# Patient Record
Sex: MALE | Race: WHITE | NOT HISPANIC OR LATINO | Employment: PART TIME | URBAN - METROPOLITAN AREA
[De-identification: names, ages, dates, MRNs, and addresses within clinical notes are randomized per-mention and may not be internally consistent; named-entity substitution may affect disease eponyms.]

---

## 2020-07-29 ENCOUNTER — APPOINTMENT (OUTPATIENT)
Dept: RADIOLOGY | Facility: MEDICAL CENTER | Age: 25
End: 2020-07-29
Attending: EMERGENCY MEDICINE
Payer: COMMERCIAL

## 2020-07-29 ENCOUNTER — HOSPITAL ENCOUNTER (EMERGENCY)
Facility: MEDICAL CENTER | Age: 25
End: 2020-07-29
Attending: EMERGENCY MEDICINE
Payer: COMMERCIAL

## 2020-07-29 VITALS
HEIGHT: 74 IN | BODY MASS INDEX: 32.25 KG/M2 | WEIGHT: 251.32 LBS | TEMPERATURE: 97.9 F | SYSTOLIC BLOOD PRESSURE: 119 MMHG | HEART RATE: 64 BPM | OXYGEN SATURATION: 98 % | RESPIRATION RATE: 18 BRPM | DIASTOLIC BLOOD PRESSURE: 55 MMHG

## 2020-07-29 DIAGNOSIS — R07.9 CHEST PAIN, UNSPECIFIED TYPE: ICD-10-CM

## 2020-07-29 LAB
ALBUMIN SERPL BCP-MCNC: 4.3 G/DL (ref 3.2–4.9)
ALBUMIN/GLOB SERPL: 1.9 G/DL
ALP SERPL-CCNC: 90 U/L (ref 30–99)
ALT SERPL-CCNC: 37 U/L (ref 2–50)
ANION GAP SERPL CALC-SCNC: 13 MMOL/L (ref 7–16)
AST SERPL-CCNC: 19 U/L (ref 12–45)
BASOPHILS # BLD AUTO: 0.7 % (ref 0–1.8)
BASOPHILS # BLD: 0.05 K/UL (ref 0–0.12)
BILIRUB SERPL-MCNC: 1.5 MG/DL (ref 0.1–1.5)
BUN SERPL-MCNC: 13 MG/DL (ref 8–22)
CALCIUM SERPL-MCNC: 9.3 MG/DL (ref 8.5–10.5)
CHLORIDE SERPL-SCNC: 108 MMOL/L (ref 96–112)
CO2 SERPL-SCNC: 23 MMOL/L (ref 20–33)
CREAT SERPL-MCNC: 1.04 MG/DL (ref 0.5–1.4)
EKG IMPRESSION: NORMAL
EOSINOPHIL # BLD AUTO: 0.18 K/UL (ref 0–0.51)
EOSINOPHIL NFR BLD: 2.6 % (ref 0–6.9)
ERYTHROCYTE [DISTWIDTH] IN BLOOD BY AUTOMATED COUNT: 39.9 FL (ref 35.9–50)
GLOBULIN SER CALC-MCNC: 2.3 G/DL (ref 1.9–3.5)
GLUCOSE SERPL-MCNC: 99 MG/DL (ref 65–99)
HCT VFR BLD AUTO: 44.2 % (ref 42–52)
HGB BLD-MCNC: 14.8 G/DL (ref 14–18)
IMM GRANULOCYTES # BLD AUTO: 0.02 K/UL (ref 0–0.11)
IMM GRANULOCYTES NFR BLD AUTO: 0.3 % (ref 0–0.9)
LYMPHOCYTES # BLD AUTO: 1.13 K/UL (ref 1–4.8)
LYMPHOCYTES NFR BLD: 16.3 % (ref 22–41)
MCH RBC QN AUTO: 29.2 PG (ref 27–33)
MCHC RBC AUTO-ENTMCNC: 33.5 G/DL (ref 33.7–35.3)
MCV RBC AUTO: 87.4 FL (ref 81.4–97.8)
MONOCYTES # BLD AUTO: 0.59 K/UL (ref 0–0.85)
MONOCYTES NFR BLD AUTO: 8.5 % (ref 0–13.4)
NEUTROPHILS # BLD AUTO: 4.95 K/UL (ref 1.82–7.42)
NEUTROPHILS NFR BLD: 71.6 % (ref 44–72)
NRBC # BLD AUTO: 0 K/UL
NRBC BLD-RTO: 0 /100 WBC
PLATELET # BLD AUTO: 169 K/UL (ref 164–446)
PMV BLD AUTO: 10.8 FL (ref 9–12.9)
POTASSIUM SERPL-SCNC: 4 MMOL/L (ref 3.6–5.5)
PROT SERPL-MCNC: 6.6 G/DL (ref 6–8.2)
RBC # BLD AUTO: 5.06 M/UL (ref 4.7–6.1)
SODIUM SERPL-SCNC: 144 MMOL/L (ref 135–145)
TROPONIN T SERPL-MCNC: 8 NG/L (ref 6–19)
WBC # BLD AUTO: 6.9 K/UL (ref 4.8–10.8)

## 2020-07-29 PROCEDURE — 93005 ELECTROCARDIOGRAM TRACING: CPT | Performed by: EMERGENCY MEDICINE

## 2020-07-29 PROCEDURE — 80053 COMPREHEN METABOLIC PANEL: CPT

## 2020-07-29 PROCEDURE — 85025 COMPLETE CBC W/AUTO DIFF WBC: CPT

## 2020-07-29 PROCEDURE — 93005 ELECTROCARDIOGRAM TRACING: CPT

## 2020-07-29 PROCEDURE — 99283 EMERGENCY DEPT VISIT LOW MDM: CPT

## 2020-07-29 PROCEDURE — 84484 ASSAY OF TROPONIN QUANT: CPT

## 2020-07-29 PROCEDURE — 71045 X-RAY EXAM CHEST 1 VIEW: CPT

## 2020-07-29 RX ORDER — LAMOTRIGINE 100 MG/1
200 TABLET ORAL DAILY
COMMUNITY

## 2020-07-29 RX ORDER — HYDROXYZINE HYDROCHLORIDE 10 MG/1
10 TABLET, FILM COATED ORAL 3 TIMES DAILY PRN
COMMUNITY

## 2020-07-29 RX ORDER — PROPRANOLOL HYDROCHLORIDE 10 MG/1
10 TABLET ORAL PRN
COMMUNITY

## 2020-07-29 NOTE — ED NOTES
Pt d/c with instructions taught by this RN. Pt verbalized d/c instructions and stated he understood. Pt left ambulatory without assistance with steady gait GCS 15, a/o x 4 no PIV.

## 2020-07-29 NOTE — ED NOTES
"Patient took his Lamictal 200mg which was change (07/22/2020) from 100mg for Bipolar II.  Patient describes his CP as \"tight, sometimes super fast, and its like tight\"     PTA patient took Propanolol 10mg and hydroxyzine 10mg. Which has improve symptoms.   "

## 2020-07-29 NOTE — ED PROVIDER NOTES
"ED Provider Note    CHIEF COMPLAINT  Chief Complaint   Patient presents with   • Chest Pain     Pt recently started lamotrigine for BPD and has been having episodes of cp with \"rapid eye movement\"        HPI  Bebeto Melendrez is a 25 y.o. male who presents with chest pain.  Patient woke up this morning feeling a racing heart fear.  Feeling his heart pounding and some tightness.  This lasted from about 4 AM to 6 AM this morning.  Prior to this episode he said a few mild episodes.  Over the last few days.  He states that when this occurred he feels like his eyes are fluttering.  He has mild associated shortness of breath.  No pleuritic pain, cough, hemoptysis, leg swelling, leg pain, orthopnea, PND.  Patient is on Lamictal.  This is prescribed by his psychiatrist, Dr. packer, at [facility].  His dose has been titrated up and on 7/22 increased from 100 mg a day to 200 mg a day.    REVIEW OF SYSTEMS  As per HPI, otherwise a 10 point review of systems is negative    PAST MEDICAL HISTORY  Past Medical History:   Diagnosis Date   • Asthma    • Bipolar 1 disorder (HCC)        SOCIAL HISTORY  Social History     Tobacco Use   • Smoking status: Former Smoker   • Smokeless tobacco: Never Used   Substance Use Topics   • Alcohol use: Not Currently   • Drug use: Not on file       SURGICAL HISTORY  History reviewed. No pertinent surgical history.    CURRENT MEDICATIONS  Home Medications     Reviewed by Kilo Kasper R.N. (Registered Nurse) on 07/29/20 at 0712  Med List Status: Partial   Medication Last Dose Status   hydrOXYzine HCl (ATARAX) 10 MG Tab  Active   lamoTRIgine (LAMICTAL) 100 MG Tab  Active   propranolol (INDERAL) 10 MG Tab  Active                ALLERGIES  No Known Allergies    PHYSICAL EXAM  VITAL SIGNS: /55   Pulse 64   Temp 36.6 °C (97.9 °F) (Oral)   Resp 18   Ht 1.88 m (6' 2\")   Wt 114 kg (251 lb 5.2 oz)   SpO2 98%   BMI 32.27 kg/m²    Constitutional: Awake and alert  HENT:  " Atraumatic, Normocephalic.Oropharynx dry mucus membranes, Nose normal inspection.   Eyes: Normal inspection  Neck: Supple  Cardiovascular: Normal heart rate, Normal rhythm.  Symmetric peripheral pulses.   Thorax & Lungs: No respiratory distress, No wheezing, No rales, No rhonchi, No chest tenderness.   Abdomen: Bowel sounds normal, soft, non-distended, nontender, no mass  Skin: Warm, Dry, No rash.   Back: No tenderness, No CVA tenderness.   Extremities: No clubbing, cyanosis, edema, no Homans or cords   Neurologic: Grossly normal   Psychiatric: Anxious appearing    RADIOLOGY/PROCEDURES  DX-CHEST-PORTABLE (1 VIEW)   Final Result      No acute cardiopulmonary abnormality.           Imaging is interpreted by radiologist    Labs:  Results for orders placed or performed during the hospital encounter of 07/29/20   CBC with Differential   Result Value Ref Range    WBC 6.9 4.8 - 10.8 K/uL    RBC 5.06 4.70 - 6.10 M/uL    Hemoglobin 14.8 14.0 - 18.0 g/dL    Hematocrit 44.2 42.0 - 52.0 %    MCV 87.4 81.4 - 97.8 fL    MCH 29.2 27.0 - 33.0 pg    MCHC 33.5 (L) 33.7 - 35.3 g/dL    RDW 39.9 35.9 - 50.0 fL    Platelet Count 169 164 - 446 K/uL    MPV 10.8 9.0 - 12.9 fL    Neutrophils-Polys 71.60 44.00 - 72.00 %    Lymphocytes 16.30 (L) 22.00 - 41.00 %    Monocytes 8.50 0.00 - 13.40 %    Eosinophils 2.60 0.00 - 6.90 %    Basophils 0.70 0.00 - 1.80 %    Immature Granulocytes 0.30 0.00 - 0.90 %    Nucleated RBC 0.00 /100 WBC    Neutrophils (Absolute) 4.95 1.82 - 7.42 K/uL    Lymphs (Absolute) 1.13 1.00 - 4.80 K/uL    Monos (Absolute) 0.59 0.00 - 0.85 K/uL    Eos (Absolute) 0.18 0.00 - 0.51 K/uL    Baso (Absolute) 0.05 0.00 - 0.12 K/uL    Immature Granulocytes (abs) 0.02 0.00 - 0.11 K/uL    NRBC (Absolute) 0.00 K/uL   Complete Metabolic Panel (CMP)   Result Value Ref Range    Sodium 144 135 - 145 mmol/L    Potassium 4.0 3.6 - 5.5 mmol/L    Chloride 108 96 - 112 mmol/L    Co2 23 20 - 33 mmol/L    Anion Gap 13.0 7.0 - 16.0    Glucose 99 65  - 99 mg/dL    Bun 13 8 - 22 mg/dL    Creatinine 1.04 0.50 - 1.40 mg/dL    Calcium 9.3 8.5 - 10.5 mg/dL    AST(SGOT) 19 12 - 45 U/L    ALT(SGPT) 37 2 - 50 U/L    Alkaline Phosphatase 90 30 - 99 U/L    Total Bilirubin 1.5 0.1 - 1.5 mg/dL    Albumin 4.3 3.2 - 4.9 g/dL    Total Protein 6.6 6.0 - 8.2 g/dL    Globulin 2.3 1.9 - 3.5 g/dL    A-G Ratio 1.9 g/dL   Troponin STAT   Result Value Ref Range    Troponin T 8 6 - 19 ng/L   ESTIMATED GFR   Result Value Ref Range    GFR If African American >60 >60 mL/min/1.73 m 2    GFR If Non African American >60 >60 mL/min/1.73 m 2   EKG (NOW)   Result Value Ref Range    Report       [facility] Emergency Dept.    Test Date:  2020  Pt Name:    TIM FERRELL          Department: ER  MRN:        [id number]                      Room:  Gender:     Male                         Technician: [id number]  :        1995                   Requested By:ER TRIAGE PROTOCOL  Order #:    [id number]                    Reading MD:    Measurements  Intervals                                Axis  Rate:       87                           P:          65  PA:         176                          QRS:        90  QRSD:       88                           T:          41  QT:         364  QTc:        438    Interpretive Statements  SINUS RHYTHM  BORDERLINE RIGHT AXIS DEVIATION  No previous ECG available for comparison           COURSE & MEDICAL DECISION MAKING  Patient presents with intermittent palpitations and some chest tightness.  His heart score is 0.  His EKG does not show preexcitation.  Laboratory data was unremarkable.  No suggestion of PE, dissection.  His chest x-ray is negative.  His vital signs have been normal and normal on the cardiac monitor during his stay in the ED.  Is unclear if this is related to his Lamictal.  I do not see chest pain or arrhythmia as a major side effect of Lamictal.  I have asked him to call his psychiatrist.  Patient to return to the ER for  recurrent persistent event, difficulty breathing or concern.    FINAL IMPRESSION  1.  Chest pain  2.  Palpitations      This dictation was created using voice recognition software. The accuracy of the dictation is limited to the abilities of the software.  The nursing notes were reviewed and certain aspects of this information were incorporated into this note.      Electronically signed by: Amilcar Hu M.D., 7/29/2020 10:25 AM

## 2020-07-29 NOTE — ED TRIAGE NOTES
"Bebeto Ríos  25 y.o. male  Chief Complaint   Patient presents with   • Chest Pain     Pt recently started lamotrigine for BPD and has been having episodes of cp with \"rapid eye movement\"    ekg done in triage  /85   Pulse 80   Temp 36.8 °C (98.2 °F) (Temporal)   Resp 18   Ht 1.88 m (6' 2\")   Wt 114 kg (251 lb 5.2 oz)   SpO2 99%   BMI 32.27 kg/m²     "

## 2022-06-04 ENCOUNTER — APPOINTMENT (OUTPATIENT)
Dept: RADIOLOGY | Facility: MEDICAL CENTER | Age: 27
End: 2022-06-04
Attending: EMERGENCY MEDICINE
Payer: COMMERCIAL

## 2022-06-04 ENCOUNTER — HOSPITAL ENCOUNTER (EMERGENCY)
Facility: MEDICAL CENTER | Age: 27
End: 2022-06-04
Attending: EMERGENCY MEDICINE
Payer: COMMERCIAL

## 2022-06-04 VITALS
RESPIRATION RATE: 16 BRPM | SYSTOLIC BLOOD PRESSURE: 125 MMHG | HEIGHT: 74 IN | WEIGHT: 294.98 LBS | BODY MASS INDEX: 37.86 KG/M2 | OXYGEN SATURATION: 97 % | HEART RATE: 98 BPM | TEMPERATURE: 98.4 F | DIASTOLIC BLOOD PRESSURE: 73 MMHG

## 2022-06-04 DIAGNOSIS — R79.89 LFT ELEVATION: ICD-10-CM

## 2022-06-04 LAB
ALBUMIN SERPL BCP-MCNC: 4.7 G/DL (ref 3.2–4.9)
ALP SERPL-CCNC: 83 U/L (ref 30–99)
ALT SERPL-CCNC: 51 U/L (ref 2–50)
ANION GAP SERPL CALC-SCNC: 13 MMOL/L (ref 7–16)
AST SERPL-CCNC: 28 U/L (ref 12–45)
BASOPHILS # BLD AUTO: 0.5 % (ref 0–1.8)
BASOPHILS # BLD: 0.03 K/UL (ref 0–0.12)
BILIRUB CONJ SERPL-MCNC: <0.2 MG/DL (ref 0.1–0.5)
BILIRUB INDIRECT SERPL-MCNC: ABNORMAL MG/DL (ref 0–1)
BILIRUB SERPL-MCNC: 0.9 MG/DL (ref 0.1–1.5)
BUN SERPL-MCNC: 21 MG/DL (ref 8–22)
CALCIUM SERPL-MCNC: 9.4 MG/DL (ref 8.5–10.5)
CHLORIDE SERPL-SCNC: 108 MMOL/L (ref 96–112)
CO2 SERPL-SCNC: 22 MMOL/L (ref 20–33)
CREAT SERPL-MCNC: 0.91 MG/DL (ref 0.5–1.4)
EKG IMPRESSION: NORMAL
EOSINOPHIL # BLD AUTO: 0.29 K/UL (ref 0–0.51)
EOSINOPHIL NFR BLD: 4.9 % (ref 0–6.9)
ERYTHROCYTE [DISTWIDTH] IN BLOOD BY AUTOMATED COUNT: 39 FL (ref 35.9–50)
GFR SERPLBLD CREATININE-BSD FMLA CKD-EPI: 120 ML/MIN/1.73 M 2
GLUCOSE SERPL-MCNC: 84 MG/DL (ref 65–99)
HCT VFR BLD AUTO: 49 % (ref 42–52)
HGB BLD-MCNC: 16.8 G/DL (ref 14–18)
IMM GRANULOCYTES # BLD AUTO: 0.04 K/UL (ref 0–0.11)
IMM GRANULOCYTES NFR BLD AUTO: 0.7 % (ref 0–0.9)
LYMPHOCYTES # BLD AUTO: 1.29 K/UL (ref 1–4.8)
LYMPHOCYTES NFR BLD: 21.9 % (ref 22–41)
MCH RBC QN AUTO: 29.5 PG (ref 27–33)
MCHC RBC AUTO-ENTMCNC: 34.3 G/DL (ref 33.7–35.3)
MCV RBC AUTO: 86 FL (ref 81.4–97.8)
MONOCYTES # BLD AUTO: 0.47 K/UL (ref 0–0.85)
MONOCYTES NFR BLD AUTO: 8 % (ref 0–13.4)
NEUTROPHILS # BLD AUTO: 3.77 K/UL (ref 1.82–7.42)
NEUTROPHILS NFR BLD: 64 % (ref 44–72)
NRBC # BLD AUTO: 0 K/UL
NRBC BLD-RTO: 0 /100 WBC
PLATELET # BLD AUTO: 193 K/UL (ref 164–446)
PMV BLD AUTO: 11 FL (ref 9–12.9)
POTASSIUM SERPL-SCNC: 4.5 MMOL/L (ref 3.6–5.5)
PROT SERPL-MCNC: 7.2 G/DL (ref 6–8.2)
RBC # BLD AUTO: 5.7 M/UL (ref 4.7–6.1)
SODIUM SERPL-SCNC: 143 MMOL/L (ref 135–145)
WBC # BLD AUTO: 5.9 K/UL (ref 4.8–10.8)

## 2022-06-04 PROCEDURE — 85025 COMPLETE CBC W/AUTO DIFF WBC: CPT

## 2022-06-04 PROCEDURE — 99284 EMERGENCY DEPT VISIT MOD MDM: CPT

## 2022-06-04 PROCEDURE — 71045 X-RAY EXAM CHEST 1 VIEW: CPT

## 2022-06-04 PROCEDURE — 93005 ELECTROCARDIOGRAM TRACING: CPT | Performed by: EMERGENCY MEDICINE

## 2022-06-04 PROCEDURE — 36415 COLL VENOUS BLD VENIPUNCTURE: CPT

## 2022-06-04 PROCEDURE — 80048 BASIC METABOLIC PNL TOTAL CA: CPT

## 2022-06-04 PROCEDURE — 80076 HEPATIC FUNCTION PANEL: CPT

## 2022-06-04 ASSESSMENT — LIFESTYLE VARIABLES
HOW MANY TIMES IN THE PAST YEAR HAVE YOU HAD 5 OR MORE DRINKS IN A DAY: 0
EVER FELT BAD OR GUILTY ABOUT YOUR DRINKING: NO
TOTAL SCORE: 0
DO YOU DRINK ALCOHOL: NO
CONSUMPTION TOTAL: NEGATIVE
TOTAL SCORE: 0
AVERAGE NUMBER OF DAYS PER WEEK YOU HAVE A DRINK CONTAINING ALCOHOL: 0
EVER HAD A DRINK FIRST THING IN THE MORNING TO STEADY YOUR NERVES TO GET RID OF A HANGOVER: NO
HAVE YOU EVER FELT YOU SHOULD CUT DOWN ON YOUR DRINKING: NO
ON A TYPICAL DAY WHEN YOU DRINK ALCOHOL HOW MANY DRINKS DO YOU HAVE: 0
TOTAL SCORE: 0
DOES PATIENT WANT TO STOP DRINKING: NO
HAVE PEOPLE ANNOYED YOU BY CRITICIZING YOUR DRINKING: NO

## 2022-06-04 ASSESSMENT — FIBROSIS 4 INDEX: FIB4 SCORE: 0.46

## 2022-06-04 NOTE — ED TRIAGE NOTES
Chief Complaint   Patient presents with   • Sent by MD   • Abnormal Labs     Pt ambulated to triage sent by his pcp due to abnormal lab Na 151. Pt said that he has been tired and overeating past couple month. Gained 20-30lbs .

## 2022-06-04 NOTE — ED NOTES
Patient walked with a steady gate at this time to er [facility] area room 58. Placed patient into gown, on heart monitor, on auto bp, spo2, gave warm blanket, and call light. Ready to be seen. rn aware    Patient able to walk to and from restroom with steady gate at this time. Urine sample sent to lab

## 2022-06-05 NOTE — DISCHARGE INSTRUCTIONS
You have some mild elevation of the liver enzymes meaning there is some mild inflammation.  Do not appear dehydrated today.  Please follow-up with your primary care physician.  Please come in with any abdominal pain or inability to eat or drink.

## 2022-06-05 NOTE — ED NOTES
Patient vital signs rechecked and documented per [facility]. Patient denies any new needs at this time.   Patient is waiting for discharge instructions, he is up to date on poc

## 2022-06-05 NOTE — ED PROVIDER NOTES
"ER Provider Note     Scribed for Fransisco Bingham M.D. by Alexi Pope. 6/4/2022, 5:09 PM.    Primary Care Provider: Pcp Pt States None  Means of Arrival: Walk in   History obtained from: Patient  History limited by: None     CHIEF COMPLAINT  Chief Complaint   Patient presents with   • Sent by MD   • Abnormal Labs       HPI  Bebeto Melendrez is a 27 y.o. male who presents to the Emergency Department after being sent by his PCP due to having abnormal labs recently. Patient states he saw a provider at [facility] on May 30 and was advised to immediately go to the ED to \"double check everything.\" States he was initially seen by [facility] to establish care with a PCP and because he has been feeling very tired lately. States he has suffered from depression for a long time, but the tiredness this time feels different. States he is feeling \"level-headed\" in terms of the depression. He also reports right side pain, \"weird\" food cravings, overeating, lightheadedness, nausea, and dry skin. He also reports shortness of breath. States he felt short of breath when he was in the lobby and when he walked into his room in the ED. However, the shortness of breath has felt inconsistent. He also feels bloated. No reports of any alleviating factors to his symptoms. No chest pain or urinary symptoms. He feels like he may not be drinking enough fluids and that dehydration may be a possibility. Patient reports regular alcohol consumption from the beginning of this year up until March or April after which he slowed down on alcohol consumption. States he also took Adderall recreationally on an occasional basis during that time as well. Denies using any other amphetamines or cocaine.    REVIEW OF SYSTEMS  See HPI for further details. All other systems are negative.     PAST MEDICAL HISTORY   has a past medical history of Asthma and Bipolar 1 disorder (HCC).    SURGICAL HISTORY  patient denies any surgical " "history    SOCIAL HISTORY  Social History     Tobacco Use   • Smoking status: Former Smoker   • Smokeless tobacco: Never Used   Substance Use Topics   • Alcohol use: Not Currently      Social History     Substance and Sexual Activity   Drug Use Not on file       FAMILY HISTORY  No family history on file.    CURRENT MEDICATIONS  Home Medications    **Home medications have not yet been reviewed for this encounter**         ALLERGIES  No Known Allergies    PHYSICAL EXAM  VITAL SIGNS: /87   Pulse (!) 101   Temp 37.1 °C (98.7 °F) (Temporal)   Resp 14   Ht 1.88 m (6' 2\")   Wt (!) 134 kg (294 lb 15.6 oz)   SpO2 99%   BMI 37.87 kg/m²    Constitutional: Alert in no apparent distress.  HENT: No signs of trauma, Bilateral external ears normal, Nose normal.   Eyes: Pupils are equal and reactive, Conjunctiva normal, Non-icteric.   Neck: Normal range of motion, No tenderness, Supple, No stridor.   Lymphatic: No lymphadenopathy noted.   Cardiovascular: Regular rate and rhythm, no palpable thrill  Thorax & Lungs: No respiratory distress,  No chest tenderness.  Mildly diminished breath sounds at the lung bases but otherwise CTAB  Abdomen: Bowel sounds normal, Soft, No tenderness, No masses, No pulsatile masses. No peritoneal signs.  Skin: Warm, Dry, No erythema, No rash.   Back: No bony tenderness, No CVA tenderness.   Extremities: Intact distal pulses, No edema, No tenderness, No cyanosis.  Musculoskeletal: Good range of motion in all major joints. No tenderness to palpation or major deformities noted.   Neurologic: Alert , Normal motor function, Normal sensory function, No focal deficits noted.   Psychiatric: Affect normal, Judgment normal, Mood normal.     DIAGNOSTIC STUDIES / PROCEDURES    EKG Interpretation:  Interpreted by me  12 Lead EKG interpreted by me to show:  Normal sinus rhythm  Rate 90  Axis: Normal  Intervals: Normal  Normal T waves  No ST elevation, depression, or T wave changes.  My impression of this " EKG: Does not indicate ischemia or arrhythmia at this time.     LABS  Labs Reviewed   CBC WITH DIFFERENTIAL - Abnormal; Notable for the following components:       Result Value    Lymphocytes 21.90 (*)     All other components within normal limits   HEPATIC FUNCTION PANEL - Abnormal; Notable for the following components:    ALT(SGPT) 51 (*)     All other components within normal limits   BASIC METABOLIC PANEL   ESTIMATED GFR     All labs reviewed by me.    RADIOLOGY  DX-CHEST-PORTABLE (1 VIEW)   Final Result      No acute cardiopulmonary abnormality.            The radiologist's interpretation of all radiological studies have been reviewed by me.    COURSE & MEDICAL DECISION MAKING  Pertinent Labs & Imaging studies reviewed. (See chart for details)    This is a 27 y.o. male that presents with abnl na at outside clinic.  Tired now.  Will eval for anemia,  elec derangment, and sob with cxr to eval for pna and ptx.  .     5:09 PM - Patient seen and examined at bedside. Ordered DX chest, hepatic function panel, CBC with differential, BMP, EKG.         NA nml.  Mild LFT abnl.  Xray nml.  Nml ekg and no cardiac pathology.  D/c home with return precautions.    FINAL IMPRESSION  1. LFT elevation          Alexi LOPEZ (Scribe), am scribing for, and in the presence of, Fransisco Bingham M.D..    Electronically signed by: Alexi Pope (Scribe), 6/4/2022    Fransisco LOPEZ M.D. personally performed the services described in this documentation, as scribed by Alexi Pope in my presence, and it is both accurate and complete.     The note accurately reflects work and decisions made by me.  Fransisco Bingham M.D.  6/5/2022  12:04 AM

## 2023-09-27 ENCOUNTER — HOSPITAL ENCOUNTER (OUTPATIENT)
Dept: RADIOLOGY | Facility: MEDICAL CENTER | Age: 28
End: 2023-09-27
Attending: PHYSICIAN ASSISTANT
Payer: COMMERCIAL

## 2023-09-27 DIAGNOSIS — M79.672 LEFT FOOT PAIN: ICD-10-CM

## 2023-09-27 PROCEDURE — 73630 X-RAY EXAM OF FOOT: CPT | Mod: LT

## 2024-05-30 ENCOUNTER — OFFICE VISIT (OUTPATIENT)
Dept: URGENT CARE | Facility: CLINIC | Age: 29
End: 2024-05-30
Payer: COMMERCIAL

## 2024-05-30 VITALS
DIASTOLIC BLOOD PRESSURE: 78 MMHG | HEIGHT: 74 IN | TEMPERATURE: 97.6 F | OXYGEN SATURATION: 97 % | RESPIRATION RATE: 20 BRPM | WEIGHT: 315 LBS | HEART RATE: 101 BPM | BODY MASS INDEX: 40.43 KG/M2 | SYSTOLIC BLOOD PRESSURE: 128 MMHG

## 2024-05-30 DIAGNOSIS — S81.011A LACERATION OF RIGHT KNEE, INITIAL ENCOUNTER: ICD-10-CM

## 2024-05-30 PROBLEM — R68.82 REDUCED LIBIDO: Status: ACTIVE | Noted: 2021-09-30

## 2024-05-30 PROBLEM — I86.1 VARICOCELE: Status: ACTIVE | Noted: 2021-09-30

## 2024-05-30 PROBLEM — N52.9 ERECTILE DYSFUNCTION: Status: ACTIVE | Noted: 2021-09-30

## 2024-05-30 PROBLEM — H66.90 ACUTE OTITIS MEDIA: Status: ACTIVE | Noted: 2024-02-12

## 2024-05-30 PROBLEM — M22.41 CHONDROMALACIA OF RIGHT PATELLA: Status: ACTIVE | Noted: 2023-02-23

## 2024-05-30 PROBLEM — R53.83 FATIGUE: Status: ACTIVE | Noted: 2021-09-30

## 2024-05-30 PROCEDURE — 90471 IMMUNIZATION ADMIN: CPT

## 2024-05-30 PROCEDURE — 3078F DIAST BP <80 MM HG: CPT

## 2024-05-30 PROCEDURE — 90715 TDAP VACCINE 7 YRS/> IM: CPT

## 2024-05-30 PROCEDURE — 3074F SYST BP LT 130 MM HG: CPT

## 2024-05-30 PROCEDURE — 12002 RPR S/N/AX/GEN/TRNK2.6-7.5CM: CPT | Mod: RT

## 2024-05-30 RX ORDER — SILDENAFIL CITRATE 20 MG/1
TABLET ORAL
COMMUNITY

## 2024-05-30 RX ORDER — ALBUTEROL SULFATE 90 UG/1
AEROSOL, METERED RESPIRATORY (INHALATION)
COMMUNITY
Start: 2023-12-26

## 2024-05-30 RX ORDER — DEXTROAMPHETAMINE SACCHARATE, AMPHETAMINE ASPARTATE, DEXTROAMPHETAMINE SULFATE AND AMPHETAMINE SULFATE 2.5; 2.5; 2.5; 2.5 MG/1; MG/1; MG/1; MG/1
TABLET ORAL
COMMUNITY

## 2024-05-30 RX ORDER — VENLAFAXINE HYDROCHLORIDE 37.5 MG/1
CAPSULE, EXTENDED RELEASE ORAL
COMMUNITY
Start: 2024-05-23

## 2024-05-30 RX ORDER — ATOMOXETINE 60 MG/1
CAPSULE ORAL
COMMUNITY

## 2024-05-30 ASSESSMENT — FIBROSIS 4 INDEX: FIB4 SCORE: 0.55

## 2024-05-31 NOTE — PROGRESS NOTES
"Subjective:   Bebeto Melendrez is a 29 y.o. male who presents for Laceration (XTODAY RIGHT KNEE laceration with kitchen knife )      HPI:        ROS As above in HPI    Medications:    Current Outpatient Medications on File Prior to Visit   Medication Sig Dispense Refill    albuterol 108 (90 Base) MCG/ACT Aero Soln inhalation aerosol       venlafaxine XR (EFFEXOR XR) 37.5 MG CAPSULE SR 24 HR       vortioxetine (TRINTELLIX) 20 MG tablet       propranolol (INDERAL) 10 MG Tab Take 10 mg by mouth as needed.      amphetamine-dextroamphetamine (ADDERALL) 10 MG Tab       atomoxetine (STRATTERA) 60 MG capsule       sildenafil (REVATIO) 20 MG tablet        No current facility-administered medications on file prior to visit.        Allergies:   Patient has no known allergies.    Problem List:   Patient Active Problem List   Diagnosis    Varicocele    Reduced libido    Fatigue    Erectile dysfunction    Chondromalacia of right patella    Acute otitis media        Surgical History:  No past surgical history on file.    Past Social Hx:   Social History     Tobacco Use    Smoking status: Former    Smokeless tobacco: Never   Vaping Use    Vaping status: Never Used   Substance Use Topics    Alcohol use: Yes    Drug use: Never          Problem list, medications, and allergies reviewed by myself today in Epic.     Objective:     /78   Pulse (!) 101   Temp 36.4 °C (97.6 °F) (Temporal)   Resp 20   Ht 1.88 m (6' 2\")   Wt (!) 149 kg (329 lb 4.8 oz)   SpO2 97%   BMI 42.28 kg/m²     Physical Exam    Assessment/Plan:       Results for orders placed or performed during the hospital encounter of 06/04/22   CBC WITH DIFFERENTIAL   Result Value Ref Range    WBC 5.9 4.8 - 10.8 K/uL    RBC 5.70 4.70 - 6.10 M/uL    Hemoglobin 16.8 14.0 - 18.0 g/dL    Hematocrit 49.0 42.0 - 52.0 %    MCV 86.0 81.4 - 97.8 fL    MCH 29.5 27.0 - 33.0 pg    MCHC 34.3 33.7 - 35.3 g/dL    RDW 39.0 35.9 - 50.0 fL    Platelet Count 193 164 - 446 K/uL    " MPV 11.0 9.0 - 12.9 fL    Neutrophils-Polys 64.00 44.00 - 72.00 %    Lymphocytes 21.90 (L) 22.00 - 41.00 %    Monocytes 8.00 0.00 - 13.40 %    Eosinophils 4.90 0.00 - 6.90 %    Basophils 0.50 0.00 - 1.80 %    Immature Granulocytes 0.70 0.00 - 0.90 %    Nucleated RBC 0.00 /100 WBC    Neutrophils (Absolute) 3.77 1.82 - 7.42 K/uL    Lymphs (Absolute) 1.29 1.00 - 4.80 K/uL    Monos (Absolute) 0.47 0.00 - 0.85 K/uL    Eos (Absolute) 0.29 0.00 - 0.51 K/uL    Baso (Absolute) 0.03 0.00 - 0.12 K/uL    Immature Granulocytes (abs) 0.04 0.00 - 0.11 K/uL    NRBC (Absolute) 0.00 K/uL   BASIC METABOLIC PANEL   Result Value Ref Range    Sodium 143 135 - 145 mmol/L    Potassium 4.5 3.6 - 5.5 mmol/L    Chloride 108 96 - 112 mmol/L    Co2 22 20 - 33 mmol/L    Glucose 84 65 - 99 mg/dL    Bun 21 8 - 22 mg/dL    Creatinine 0.91 0.50 - 1.40 mg/dL    Calcium 9.4 8.5 - 10.5 mg/dL    Anion Gap 13.0 7.0 - 16.0   HEPATIC FUNCTION PANEL   Result Value Ref Range    Alkaline Phosphatase 83 30 - 99 U/L    AST(SGOT) 28 12 - 45 U/L    ALT(SGPT) 51 (H) 2 - 50 U/L    Total Bilirubin 0.9 0.1 - 1.5 mg/dL    Direct Bilirubin <0.2 0.1 - 0.5 mg/dL    Indirect Bilirubin see below 0.0 - 1.0 mg/dL    Albumin 4.7 3.2 - 4.9 g/dL    Total Protein 7.2 6.0 - 8.2 g/dL   ESTIMATED GFR   Result Value Ref Range    GFR (CKD-EPI) 120 >60 mL/min/1.73 m 2   EKG (NOW)   Result Value Ref Range    Report       [facility] Emergency Dept.    Test Date:  2022  Pt Name:    TIM SHAFFER          Department: ER  MRN:        [id number]                      Room:       [facility]  Gender:     Male                         Technician: [id number]  :        1995                   Requested By:RODRIGO SHETTY  Order #:    [id number]                    Reading MD:    Measurements  Intervals                                Axis  Rate:       90                           P:          59  NJ:         158                          QRS:        90  QRSD:       80                            T:          34  QT:         336  QTc:        412    Interpretive Statements  Sinus rhythm  Borderline right axis deviation  Compared to ECG 07/29/2020 06:44:09  No significant changes         Diagnosis and associated orders:   There are no diagnoses linked to this encounter.     Comments/MDM:       Procedure: Laceration Repair  Location: Right medial knee  Size: approximately 3 inches, linear laceration  -Risks including bleeding, nerve damage, infection, and poor cosmetic outcome discussed. Benefits and alternatives discussed.   - Wound was thoroughly cleaned with NS and Hibiclens  -No tendon/nerve/vascular involvement. No contamination  -Clean technique with sterile instruments and suture used  -Local anesthesia with 1% lidocaine without epi, 25 CC in total  -Closed with 4-0 Nylon interrupted sutures with good wound approximation, 9 sutures in total. Hemostasis achieved.  -Polysporin and dressing placed  -The patient tolerated the procedure well with no immediate complications.  There was nominal blood loss.     The patient is alerted to watch for any signs of infection (redness, pus, pain, increased swelling or fever) and return to UC if increased signs of infection are appreciated.  Home wound care instructions are provided. Tetanus vaccination updated today. Patient tolerated well.   Advised scheduled Tylenol and ice packs as needed for discomfort. Return to UC in 7-10 days for suture removal.  Follow up with PCP encouraged.        Return to clinic or go to ED if symptoms worsen or persist. Indications for ED discussed at length. Patient/Parent/Guardian voices understanding. Follow-up with your primary care provider in 3-5 days. Red flag symptoms discussed. All side effects of medication discussed including allergic response, GI upset, tendon injury, rash, sedation etc.    Please note that this dictation was created using voice recognition software. I have made a reasonable attempt to  correct obvious errors, but I expect that there are errors of grammar and possibly content that I did not discover before finalizing the note.    This note was electronically signed by ANTONIO Ramsey

## 2024-06-01 NOTE — PROGRESS NOTES
"Subjective:   Bebeto Melendrez is a 29 y.o. male who presents for Laceration (XTODAY RIGHT KNEE laceration with kitchen knife )      HPI:    Patient presents to urgent care with concerns of laceration sustained tonight while at home. States he was loading his  when he accidentally cut his right medical knee on a sharp knife inside of his . Endorses washing his wound initially at home with soap and water. Has been unable to get the bleeding to stop. Tdap is more than 5 years old.     ROS As above in HPI    Medications:    Current Outpatient Medications on File Prior to Visit   Medication Sig Dispense Refill    albuterol 108 (90 Base) MCG/ACT Aero Soln inhalation aerosol       venlafaxine XR (EFFEXOR XR) 37.5 MG CAPSULE SR 24 HR       vortioxetine (TRINTELLIX) 20 MG tablet       propranolol (INDERAL) 10 MG Tab Take 10 mg by mouth as needed.      amphetamine-dextroamphetamine (ADDERALL) 10 MG Tab       atomoxetine (STRATTERA) 60 MG capsule       sildenafil (REVATIO) 20 MG tablet        No current facility-administered medications on file prior to visit.        Allergies:   Patient has no known allergies.    Problem List:   Patient Active Problem List   Diagnosis    Varicocele    Reduced libido    Fatigue    Erectile dysfunction    Chondromalacia of right patella    Acute otitis media        Surgical History:  No past surgical history on file.    Past Social Hx:   Social History     Tobacco Use    Smoking status: Former    Smokeless tobacco: Never   Vaping Use    Vaping status: Never Used   Substance Use Topics    Alcohol use: Yes    Drug use: Never          Problem list, medications, and allergies reviewed by myself today in Epic.     Objective:     /78   Pulse (!) 101   Temp 36.4 °C (97.6 °F) (Temporal)   Resp 20   Ht 1.88 m (6' 2\")   Wt (!) 149 kg (329 lb 4.8 oz)   SpO2 97%   BMI 42.28 kg/m²     Physical Exam  Vitals and nursing note reviewed.   Constitutional:       General: " He is not in acute distress.     Appearance: Normal appearance. He is not ill-appearing or diaphoretic.   Cardiovascular:      Rate and Rhythm: Normal rate and regular rhythm.      Heart sounds: Normal heart sounds.   Pulmonary:      Effort: Pulmonary effort is normal.      Breath sounds: Normal breath sounds.   Skin:     General: Skin is warm and dry.      Capillary Refill: Capillary refill takes less than 2 seconds.      Findings: Laceration present.      Comments: Right medial knee has partial thickness linear laceration approximately 3 inches in length. Actively bleeding. No FB present.    Neurological:      Mental Status: He is alert and oriented to person, place, and time.         Assessment/Plan:       Diagnosis and associated orders:   1. Laceration of right knee, initial encounter  - Tdap =>8yo IM  - Laceration Repair    Other orders  - amphetamine-dextroamphetamine (ADDERALL) 10 MG Tab  - albuterol 108 (90 Base) MCG/ACT Aero Soln inhalation aerosol  - atomoxetine (STRATTERA) 60 MG capsule  - sildenafil (REVATIO) 20 MG tablet  - venlafaxine XR (EFFEXOR XR) 37.5 MG CAPSULE SR 24 HR  - vortioxetine (TRINTELLIX) 20 MG tablet        Comments/MDM:     Procedure: Laceration Repair  Location: Right medial knee  Size: approximately 3 inches, linear laceration  -Risks including bleeding, nerve damage, infection, and poor cosmetic outcome discussed. Benefits and alternatives discussed.   - Wound was thoroughly cleaned with NS and Hibiclens  -No tendon/nerve/vascular involvement. No contamination  -Clean technique with sterile instruments and suture used  -Local anesthesia with 1% lidocaine without epi, 5 CC in total  -Closed with 4-0 Nylon interrupted sutures with good wound approximation, 9 sutures in total. Hemostasis achieved.  -Polysporin and dressing placed  -The patient tolerated the procedure well with no immediate complications.  There was nominal blood loss.     The patient is alerted to watch for any signs  of infection (redness, pus, pain, increased swelling or fever) and return to UC if increased signs of infection are appreciated.  Home wound care instructions are provided. Tetanus vaccination updated today. Patient tolerated well.   Advised scheduled Tylenol and ice packs as needed for discomfort. Return to UC in 7-10 days for suture removal.  Follow up with PCP encouraged.        Return to clinic or go to ED if symptoms worsen or persist. Indications for ED discussed at length. Patient/Parent/Guardian voices understanding. Follow-up with your primary care provider in 3-5 days. Red flag symptoms discussed. All side effects of medication discussed including allergic response, GI upset, tendon injury, rash, sedation etc.    Please note that this dictation was created using voice recognition software. I have made a reasonable attempt to correct obvious errors, but I expect that there are errors of grammar and possibly content that I did not discover before finalizing the note.    This note was electronically signed by ANTONIO Ramsey